# Patient Record
Sex: MALE | Employment: UNEMPLOYED | URBAN - METROPOLITAN AREA
[De-identification: names, ages, dates, MRNs, and addresses within clinical notes are randomized per-mention and may not be internally consistent; named-entity substitution may affect disease eponyms.]

---

## 2023-02-03 ENCOUNTER — HOSPITAL ENCOUNTER (EMERGENCY)
Facility: CLINIC | Age: 33
Discharge: HOME | End: 2023-02-03
Attending: FAMILY MEDICINE | Admitting: FAMILY MEDICINE

## 2023-02-03 VITALS
OXYGEN SATURATION: 99 % | DIASTOLIC BLOOD PRESSURE: 69 MMHG | TEMPERATURE: 98.3 F | WEIGHT: 161.5 LBS | SYSTOLIC BLOOD PRESSURE: 126 MMHG | HEIGHT: 67 IN | HEART RATE: 76 BPM | RESPIRATION RATE: 15 BRPM | BODY MASS INDEX: 25.35 KG/M2

## 2023-02-03 DIAGNOSIS — F41.9 ANXIETY: ICD-10-CM

## 2023-02-03 DIAGNOSIS — F19.10 SUBSTANCE ABUSE (H): ICD-10-CM

## 2023-02-03 DIAGNOSIS — F90.9 ATTENTION DEFICIT HYPERACTIVITY DISORDER (ADHD), UNSPECIFIED ADHD TYPE: ICD-10-CM

## 2023-02-03 LAB
AMPHETAMINES UR QL SCN: NORMAL
BARBITURATES UR QL: NORMAL
BENZODIAZ UR QL: NORMAL
CANNABINOIDS UR QL SCN: NORMAL
COCAINE UR QL: NORMAL
OPIATES UR QL SCN: NORMAL

## 2023-02-03 PROCEDURE — 99283 EMERGENCY DEPT VISIT LOW MDM: CPT | Performed by: FAMILY MEDICINE

## 2023-02-03 PROCEDURE — 250N000013 HC RX MED GY IP 250 OP 250 PS 637: Performed by: FAMILY MEDICINE

## 2023-02-03 PROCEDURE — 99284 EMERGENCY DEPT VISIT MOD MDM: CPT | Performed by: FAMILY MEDICINE

## 2023-02-03 PROCEDURE — 80307 DRUG TEST PRSMV CHEM ANLYZR: CPT | Performed by: FAMILY MEDICINE

## 2023-02-03 RX ORDER — GABAPENTIN 300 MG/1
300 CAPSULE ORAL 3 TIMES DAILY
Qty: 30 CAPSULE | Refills: 1 | Status: SHIPPED | OUTPATIENT
Start: 2023-02-03

## 2023-02-03 RX ORDER — GABAPENTIN 300 MG/1
300 CAPSULE ORAL ONCE
Status: COMPLETED | OUTPATIENT
Start: 2023-02-03 | End: 2023-02-03

## 2023-02-03 RX ADMIN — GABAPENTIN 300 MG: 300 CAPSULE ORAL at 14:42

## 2023-02-03 ASSESSMENT — ACTIVITIES OF DAILY LIVING (ADL): ADLS_ACUITY_SCORE: 35

## 2023-02-03 NOTE — DISCHARGE INSTRUCTIONS
Discharge back to [facility] with prescription for gabapentin return for further evaluation when able.

## 2023-02-03 NOTE — ED TRIAGE NOTES
Pt was recommended by treatment center ([facility]) to get evaluated for increased ADHD meds; Mental health evaluation.  Pt has been in ETOH treatment since 1/20/23, Pt was in custody before that. Pt rambling a lot in triage, random topic of conversation changes.

## 2023-02-04 NOTE — ED PROVIDER NOTES
"ED Provider Note  [facility]      History     Chief Complaint   Patient presents with     Psychiatric Evaluation     HPI  Darrick Garcia is a 32 year old male who was sent to the emergency room from [facility] where he has been attending chemical dependency treatment there is some concern that he may in fact have ADHD that has not been fully diagnosed and they were concerned about the possibility of getting back on medications.  In distant past patient had been on Adderall he also more recently has been on gabapentin as well.  Patient denies any suicidal ideation denies any homicidal ideation denies any intent to harm himself he does feel \"restless\" and was pacing somewhat here in the emergency room but once again did not appear to be an imminent threat to himself or others.  I offered a full DEC assessment patient is at this time not willing to wait we did start him back on his gabapentin but will not start him on Adderall without a more thorough evaluation.    Past Medical History  History reviewed. No pertinent past medical history.  History reviewed. No pertinent surgical history.  gabapentin (NEURONTIN) 300 MG capsule      Allergies   Allergen Reactions     Betamethasone Dipropionate (Augmented) [Betamethasone]      Septra [Sulfamethoxazole W/Trimethoprim]      Family History  History reviewed. No pertinent family history.  Social History   Social History     Tobacco Use     Smoking status: Every Day     Types: Cigarettes   Substance Use Topics     Alcohol use: Not Currently     Comment: last use 01/23     Drug use: Not Currently      Past medical history, past surgical history, medications, allergies, family history, and social history were reviewed with the patient. No additional pertinent items.      A medically appropriate review of systems was performed with pertinent positives and negatives noted in the HPI, and all other systems negative.    Physical Exam   BP: " "126/69  Pulse: 76  Temp: 98.3  F (36.8  C)  Resp: 15  Height: 170.2 cm (5' 7\")  Weight: 73.3 kg (161 lb 8 oz)  SpO2: 99 %  Physical Exam  Constitutional:       General: He is not in acute distress.     Appearance: He is not diaphoretic.   HENT:      Head: Atraumatic.   Eyes:      General: No scleral icterus.     Pupils: Pupils are equal, round, and reactive to light.   Cardiovascular:      Heart sounds: Normal heart sounds.   Pulmonary:      Effort: No respiratory distress.      Breath sounds: Normal breath sounds.   Abdominal:      General: Bowel sounds are normal.      Palpations: Abdomen is soft.      Tenderness: There is no abdominal tenderness.   Musculoskeletal:         General: No tenderness.   Skin:     General: Skin is warm.      Findings: No rash.   Neurological:      General: No focal deficit present.      Mental Status: He is oriented to person, place, and time.      Sensory: No sensory deficit.      Motor: No weakness.      Coordination: Coordination normal.   Psychiatric:         Attention and Perception: He does not perceive auditory or visual hallucinations.         Mood and Affect: Mood is anxious.         Behavior: Behavior is hyperactive.         Thought Content: Thought content does not include homicidal or suicidal ideation.           ED Course, Procedures, & Data      Procedures           Results for orders placed or performed during the hospital encounter of 02/03/23   Drug abuse screen 1 urine (ED)     Status: Normal   Result Value Ref Range    Amphetamines Urine Screen Negative Screen Negative    Barbiturates Urine Screen Negative Screen Negative    Benzodiazepines Urine Screen Negative Screen Negative    Cannabinoids Urine Screen Negative Screen Negative    Cocaine Urine Screen Negative Screen Negative    Opiates Urine Screen Negative Screen Negative   Urine Drugs of Abuse Screen     Status: Normal    Narrative    The following orders were created for panel order Urine Drugs of Abuse " Screen.  Procedure                               Abnormality         Status                     ---------                               -----------         ------                     Drug abuse screen 1 urin...[[id number]]  Normal              Final result                 Please view results for these tests on the individual orders.     Medications   gabapentin (NEURONTIN) capsule 300 mg (300 mg Oral Given 2/3/23 1442)     Labs Ordered and Resulted from Time of ED Arrival to Time of ED Departure   DRUG ABUSE SCREEN 1 URINE (ED) - Normal       Result Value    Amphetamines Urine Screen Negative      Barbiturates Urine Screen Negative      Benzodiazepines Urine Screen Negative      Cannabinoids Urine Screen Negative      Cocaine Urine Screen Negative      Opiates Urine Screen Negative       No orders to display          Medical Decision Making  The patient's presentation is strongly suggestive of a chronic illness mild to moderate exacerbation, progression, or side effect of treatment.    The patient's evaluation involved:  ordering and/or review of 1 test(s) in this encounter (see separate area of note for details)    The patient's management involved prescription drug management (including medications given in the ED).      Assessment & Plan        I have reviewed the nursing notes. I have reviewed the findings, diagnosis, plan and need for follow up with the patient.    Discharge Medication List as of 2/3/2023  3:30 PM      START taking these medications    Details   gabapentin (NEURONTIN) 300 MG capsule Take 1 capsule (300 mg) by mouth 3 times daily, Disp-30 capsule, R-1, Local Print           Patient with his likely history of ADHD and underlying anxiety history of substance abuse at this time is not willing to stay for full DEC evaluation he was given a prescription for Neurontin and given first dose of Neurontin here in the emergency room with improvement of his restlessness patient will return if there are any  increased symptoms or any concerns of safety.          Final diagnoses:   Attention deficit hyperactivity disorder (ADHD), unspecified ADHD type   Anxiety   Substance abuse (H)         [facility] EMERGENCY DEPARTMENT  2/3/2023     Esau Benavidez MD  02/04/23 0860

## 2023-07-20 ENCOUNTER — HOSPITAL ENCOUNTER (EMERGENCY)
Facility: CLINIC | Age: 33
Discharge: HOME | End: 2023-07-20
Attending: FAMILY MEDICINE | Admitting: FAMILY MEDICINE

## 2023-07-20 VITALS
DIASTOLIC BLOOD PRESSURE: 87 MMHG | BODY MASS INDEX: 21.41 KG/M2 | TEMPERATURE: 98.5 F | OXYGEN SATURATION: 99 % | RESPIRATION RATE: 18 BRPM | HEIGHT: 66 IN | HEART RATE: 84 BPM | WEIGHT: 133.2 LBS | SYSTOLIC BLOOD PRESSURE: 130 MMHG

## 2023-07-20 DIAGNOSIS — M79.671 CHRONIC FOOT PAIN, RIGHT: ICD-10-CM

## 2023-07-20 DIAGNOSIS — G89.29 CHRONIC FOOT PAIN, RIGHT: ICD-10-CM

## 2023-07-20 PROCEDURE — 99283 EMERGENCY DEPT VISIT LOW MDM: CPT | Performed by: FAMILY MEDICINE

## 2023-07-20 PROCEDURE — 250N000013 HC RX MED GY IP 250 OP 250 PS 637: Performed by: FAMILY MEDICINE

## 2023-07-20 RX ORDER — GABAPENTIN 300 MG/1
300 CAPSULE ORAL 3 TIMES DAILY
Qty: 30 CAPSULE | Refills: 0 | Status: SHIPPED | OUTPATIENT
Start: 2023-07-20

## 2023-07-20 RX ORDER — ACETAMINOPHEN 500 MG
1000 TABLET ORAL ONCE
Status: COMPLETED | OUTPATIENT
Start: 2023-07-20 | End: 2023-07-20

## 2023-07-20 RX ORDER — GABAPENTIN 300 MG/1
300 CAPSULE ORAL ONCE
Status: COMPLETED | OUTPATIENT
Start: 2023-07-20 | End: 2023-07-20

## 2023-07-20 RX ADMIN — ACETAMINOPHEN 1000 MG: 500 TABLET, FILM COATED ORAL at 09:13

## 2023-07-20 RX ADMIN — GABAPENTIN 300 MG: 300 CAPSULE ORAL at 09:13

## 2023-07-20 ASSESSMENT — ACTIVITIES OF DAILY LIVING (ADL)
ADLS_ACUITY_SCORE: 33
ADLS_ACUITY_SCORE: 35

## 2023-07-20 NOTE — ED PROVIDER NOTES
"ED Provider Note  [facility]      History     Chief Complaint   Patient presents with     Foot Pain     Bilateral foot pain thinks might be due to frost bite he had sometime back     HPI  Darrick Garcia is a 32 year old male who presents emergency room with bilateral foot pain.  Patient has had history of third-degree frostbite few years ago typically takes gabapentin is not on this at this point.  Denies any redness fevers chills trauma etc.  Patient presents now with pain in both feet.  Other history limited at this point.  Patient is hungry also.  No other major complaints noted no cough chest pain shortness of breath.    Past Medical History  No past medical history on file.  No past surgical history on file.  gabapentin (NEURONTIN) 300 MG capsule  gabapentin (NEURONTIN) 300 MG capsule      Allergies   Allergen Reactions     Betamethasone Dipropionate (Augmented) [Betamethasone]      Septra [Sulfamethoxazole-Trimethoprim]      Family History  No family history on file.  Social History   Social History     Tobacco Use     Smoking status: Every Day     Types: Cigarettes   Substance Use Topics     Alcohol use: Not Currently     Comment: last use 01/23     Drug use: Not Currently         A medically appropriate review of systems was performed with pertinent positives and negatives noted in the HPI, and all other systems negative.    Physical Exam   BP: 130/87  Pulse: 84  Temp: 98.5  F (36.9  C)  Resp: 18  Height: 167.6 cm (5' 6\")  Weight: 60.4 kg (133 lb 3.2 oz)  SpO2: 99 %  Physical Exam  Vitals and nursing note reviewed.   Constitutional:       General: He is in acute distress.      Appearance: He is well-developed. He is not toxic-appearing or diaphoretic.      Comments: Patient nontoxic here resting but arousable to voice.  Is not writhing in pain   HENT:      Head: Normocephalic and atraumatic.      Nose: Nose normal.      Mouth/Throat:      Mouth: Mucous membranes are moist.      " Pharynx: Oropharynx is clear.   Eyes:      General: No scleral icterus.     Extraocular Movements: Extraocular movements intact.      Conjunctiva/sclera: Conjunctivae normal.   Cardiovascular:      Rate and Rhythm: Normal rate and regular rhythm.   Pulmonary:      Effort: No respiratory distress.      Breath sounds: No stridor.   Abdominal:      General: There is no distension.      Tenderness: There is no guarding.   Musculoskeletal:         General: Tenderness present. No swelling.      Cervical back: Normal range of motion and neck supple. No rigidity.   Skin:     General: Skin is warm and dry.      Findings: Erythema present. No rash.      Comments: Further examination of right foot which is patient's primary concern reveals just some hyperemia of first and second toes without gangrenous changes or ulcerations or cellulitis etc.   Neurological:      General: No focal deficit present.      Mental Status: He is alert and oriented to person, place, and time. Mental status is at baseline.   Psychiatric:      Comments: No acute distress at this point not suicidal homicidal not delusional           ED Course, Procedures, & Data       Records reviewed in epic.  Patient in April of this year seen in the ER for homelessness along with this patient seen for ADHD also.    Reviewing medications patient is on gabapentin 300 mg 3 times a day has not been taking it recently I did order dose of this along with Tylenol we will reassess and take a closer look at his feet.    Patient resting able to eat also.  Reassessed at this point patient is out of his gabapentin I refilled this prescription he does not really have clinic although did refer him to other clinics also.  Patient should return if concerns otherwise follow-up with MD regarding ongoing chronic foot pain from previous frostbite a couple years ago      Procedures                     No results found for any visits on 07/20/23.  Medications   gabapentin (NEURONTIN)  capsule 300 mg (300 mg Oral $Given 7/20/23 0913)   acetaminophen (TYLENOL) tablet 1,000 mg (1,000 mg Oral $Given 7/20/23 0913)     Labs Ordered and Resulted from Time of ED Arrival to Time of ED Departure - No data to display  No orders to display          Critical care was not performed.     Medical Decision Making  The patient's presentation was of low complexity (a stable chronic illness).    The patient's evaluation involved:  review of external note(s) from 2 sources (see separate area of note for details)  ordering and/or review of 1 test(s) in this encounter (see separate area of note for details)  review of 2 test result(s) ordered prior to this encounter (see separate area of note for details)    The patient's management necessitated moderate risk (prescription drug management including medications given in the ED).      Assessment & Plan   32-year-old male history of previous frostbite a couple years ago with chronic foot pain presented the ER regarding this.  Patient is out of his gabapentin which he takes 300 mg 3 times a day.  Here in the ER there was not any sign of any gross infection or vascular concern chronic hyperemia noted.  Patient otherwise resting was interested in getting something to eat also did receive 300 mg of gabapentin orally Tylenol a gram also patient feeling somewhat better discharged with the gabapentin prescription and follow-up with clinic referrals etc. should return if any concerns at all.  Appears to be more just a chronic neuropathy type pain that he has been managed outpatient without any acute event noted except pain worsened as he is out of his current medications.       I have reviewed the nursing notes. I have reviewed the findings, diagnosis, plan and need for follow up with the patient.    Discharge Medication List as of 7/20/2023 11:25 AM      START taking these medications    Details   !! gabapentin (NEURONTIN) 300 MG capsule Take 1 capsule (300 mg) by mouth 3 times  daily, Disp-30 capsule, R-0, Local Print       !! - Potential duplicate medications found. Please discuss with provider.          Final diagnoses:   Chronic foot pain, right       Julio C Valverde  [facility] EMERGENCY DEPARTMENT  7/20/2023    This note was created at least in part by the use of dragon voice dictation system. Inadvertent typographical errors may still exist.  Julio C Valverde MD.    Patient evaluated in the emergency department during the COVID-19 pandemic period. Careful attention to patients safety was addressed throughout the evaluation. Evaluation and treatment management was initiated with disposition made efficiently and appropriate as possible to minimize any risk of potential exposure to patient during this evaluation.       Julio C Valverde MD  07/20/23 [id number]

## 2023-07-20 NOTE — DISCHARGE INSTRUCTIONS
Home.  Take the gabapentin for pain.  Tylenol for pain.  Follow up with MD.  Return if concerns.    Please make an appointment to follow up with Your Primary Care Provider[facility] (phone: [phone]), [facility] - [facility] (phone: [phone]), Primary Care - [facility] (phone: [phone]), and Primary Care - [facility] (phone: [phone]) as soon as possible.

## 2024-08-30 ENCOUNTER — HOSPITAL ENCOUNTER (EMERGENCY)
Facility: CLINIC | Age: 34
Discharge: LEFT BEFORE DISCHARGE | End: 2024-08-30
Admitting: FAMILY MEDICINE

## 2024-08-30 PROCEDURE — 99281 EMR DPT VST MAYX REQ PHY/QHP: CPT | Performed by: FAMILY MEDICINE

## 2024-08-30 ASSESSMENT — COLUMBIA-SUICIDE SEVERITY RATING SCALE - C-SSRS
2. HAVE YOU ACTUALLY HAD ANY THOUGHTS OF KILLING YOURSELF IN THE PAST MONTH?: NO
1. IN THE PAST MONTH, HAVE YOU WISHED YOU WERE DEAD OR WISHED YOU COULD GO TO SLEEP AND NOT WAKE UP?: NO
6. HAVE YOU EVER DONE ANYTHING, STARTED TO DO ANYTHING, OR PREPARED TO DO ANYTHING TO END YOUR LIFE?: NO

## 2024-08-30 NOTE — ED NOTES
"During triage, pt announced, \"I am denying care.\" Pt reports he makes his own decisions and is denying care. Pt signed declination form and left.  "